# Patient Record
Sex: MALE | Race: WHITE | HISPANIC OR LATINO | Employment: OTHER | URBAN - METROPOLITAN AREA
[De-identification: names, ages, dates, MRNs, and addresses within clinical notes are randomized per-mention and may not be internally consistent; named-entity substitution may affect disease eponyms.]

---

## 2021-01-18 ENCOUNTER — HOSPITAL ENCOUNTER (EMERGENCY)
Facility: HOSPITAL | Age: 56
Discharge: HOME | End: 2021-01-18
Attending: EMERGENCY MEDICINE | Admitting: EMERGENCY MEDICINE

## 2021-01-18 VITALS
DIASTOLIC BLOOD PRESSURE: 84 MMHG | OXYGEN SATURATION: 97 % | HEART RATE: 89 BPM | HEIGHT: 64 IN | BODY MASS INDEX: 29.02 KG/M2 | SYSTOLIC BLOOD PRESSURE: 135 MMHG | WEIGHT: 170 LBS | RESPIRATION RATE: 18 BRPM | TEMPERATURE: 99.1 F

## 2021-01-18 DIAGNOSIS — U07.1 COVID-19: Primary | ICD-10-CM

## 2021-01-18 DIAGNOSIS — E11.9 NEW ONSET TYPE 2 DIABETES MELLITUS (HCC): ICD-10-CM

## 2021-01-18 LAB
ALBUMIN SERPL-MCNC: 3.3 G/DL (ref 3.5–5.2)
ALBUMIN/GLOB SERPL: 0.7 G/DL
ALP SERPL-CCNC: 91 U/L (ref 39–117)
ALT SERPL W P-5'-P-CCNC: 28 U/L (ref 1–41)
ANION GAP SERPL CALCULATED.3IONS-SCNC: 12.1 MMOL/L (ref 5–15)
AST SERPL-CCNC: 33 U/L (ref 1–40)
BACTERIA UR QL AUTO: ABNORMAL /HPF
BASOPHILS # BLD AUTO: 0.03 10*3/MM3 (ref 0–0.2)
BASOPHILS NFR BLD AUTO: 0.4 % (ref 0–1.5)
BILIRUB SERPL-MCNC: 0.5 MG/DL (ref 0–1.2)
BILIRUB UR QL STRIP: ABNORMAL
BUN SERPL-MCNC: 16 MG/DL (ref 6–20)
BUN/CREAT SERPL: 18 (ref 7–25)
CALCIUM SPEC-SCNC: 8.7 MG/DL (ref 8.6–10.5)
CHLORIDE SERPL-SCNC: 96 MMOL/L (ref 98–107)
CLARITY UR: CLEAR
CO2 SERPL-SCNC: 21.9 MMOL/L (ref 22–29)
COLOR UR: YELLOW
CREAT SERPL-MCNC: 0.89 MG/DL (ref 0.76–1.27)
DEPRECATED RDW RBC AUTO: 38.4 FL (ref 37–54)
EOSINOPHIL # BLD AUTO: 0.01 10*3/MM3 (ref 0–0.4)
EOSINOPHIL NFR BLD AUTO: 0.1 % (ref 0.3–6.2)
ERYTHROCYTE [DISTWIDTH] IN BLOOD BY AUTOMATED COUNT: 12.2 % (ref 12.3–15.4)
FLUAV RNA RESP QL NAA+PROBE: NOT DETECTED
FLUBV RNA RESP QL NAA+PROBE: NOT DETECTED
GFR SERPL CREATININE-BSD FRML MDRD: 89 ML/MIN/1.73
GLOBULIN UR ELPH-MCNC: 4.6 GM/DL
GLUCOSE BLDC GLUCOMTR-MCNC: 205 MG/DL (ref 70–130)
GLUCOSE SERPL-MCNC: 304 MG/DL (ref 65–99)
GLUCOSE UR STRIP-MCNC: ABNORMAL MG/DL
HBA1C MFR BLD: 12.1 % (ref 4.8–5.6)
HCT VFR BLD AUTO: 41.5 % (ref 37.5–51)
HGB BLD-MCNC: 14.3 G/DL (ref 13–17.7)
HGB UR QL STRIP.AUTO: ABNORMAL
HYALINE CASTS UR QL AUTO: ABNORMAL /LPF
IMM GRANULOCYTES # BLD AUTO: 0.14 10*3/MM3 (ref 0–0.05)
IMM GRANULOCYTES NFR BLD AUTO: 1.7 % (ref 0–0.5)
KETONES UR QL STRIP: ABNORMAL
LEUKOCYTE ESTERASE UR QL STRIP.AUTO: NEGATIVE
LIPASE SERPL-CCNC: 38 U/L (ref 13–60)
LYMPHOCYTES # BLD AUTO: 1.65 10*3/MM3 (ref 0.7–3.1)
LYMPHOCYTES NFR BLD AUTO: 20.3 % (ref 19.6–45.3)
MCH RBC QN AUTO: 29.5 PG (ref 26.6–33)
MCHC RBC AUTO-ENTMCNC: 34.5 G/DL (ref 31.5–35.7)
MCV RBC AUTO: 85.7 FL (ref 79–97)
MONOCYTES # BLD AUTO: 0.69 10*3/MM3 (ref 0.1–0.9)
MONOCYTES NFR BLD AUTO: 8.5 % (ref 5–12)
NEUTROPHILS NFR BLD AUTO: 5.6 10*3/MM3 (ref 1.7–7)
NEUTROPHILS NFR BLD AUTO: 69 % (ref 42.7–76)
NITRITE UR QL STRIP: NEGATIVE
NRBC BLD AUTO-RTO: 0 /100 WBC (ref 0–0.2)
PH UR STRIP.AUTO: 6 [PH] (ref 4.5–8)
PLAT MORPH BLD: NORMAL
PLATELET # BLD AUTO: 232 10*3/MM3 (ref 140–450)
PMV BLD AUTO: 10.1 FL (ref 6–12)
POTASSIUM SERPL-SCNC: 4.1 MMOL/L (ref 3.5–5.2)
PROT SERPL-MCNC: 7.9 G/DL (ref 6–8.5)
PROT UR QL STRIP: ABNORMAL
RBC # BLD AUTO: 4.84 10*6/MM3 (ref 4.14–5.8)
RBC # UR: ABNORMAL /HPF
RBC MORPH BLD: NORMAL
REF LAB TEST METHOD: ABNORMAL
SARS-COV-2 RNA RESP QL NAA+PROBE: DETECTED
SODIUM SERPL-SCNC: 130 MMOL/L (ref 136–145)
SP GR UR STRIP: 1.02 (ref 1–1.03)
SQUAMOUS #/AREA URNS HPF: ABNORMAL /HPF
UROBILINOGEN UR QL STRIP: ABNORMAL
WBC # BLD AUTO: 8.12 10*3/MM3 (ref 3.4–10.8)
WBC MORPH BLD: NORMAL
WBC UR QL AUTO: ABNORMAL /HPF

## 2021-01-18 PROCEDURE — 87636 SARSCOV2 & INF A&B AMP PRB: CPT | Performed by: PHYSICIAN ASSISTANT

## 2021-01-18 PROCEDURE — 96374 THER/PROPH/DIAG INJ IV PUSH: CPT

## 2021-01-18 PROCEDURE — 81001 URINALYSIS AUTO W/SCOPE: CPT | Performed by: PHYSICIAN ASSISTANT

## 2021-01-18 PROCEDURE — 99283 EMERGENCY DEPT VISIT LOW MDM: CPT

## 2021-01-18 PROCEDURE — 80053 COMPREHEN METABOLIC PANEL: CPT | Performed by: PHYSICIAN ASSISTANT

## 2021-01-18 PROCEDURE — 85025 COMPLETE CBC W/AUTO DIFF WBC: CPT | Performed by: PHYSICIAN ASSISTANT

## 2021-01-18 PROCEDURE — 82962 GLUCOSE BLOOD TEST: CPT

## 2021-01-18 PROCEDURE — 25010000002 ONDANSETRON PER 1 MG: Performed by: EMERGENCY MEDICINE

## 2021-01-18 PROCEDURE — 63710000001 INSULIN REGULAR HUMAN PER 5 UNITS: Performed by: EMERGENCY MEDICINE

## 2021-01-18 PROCEDURE — 85007 BL SMEAR W/DIFF WBC COUNT: CPT | Performed by: PHYSICIAN ASSISTANT

## 2021-01-18 PROCEDURE — 83036 HEMOGLOBIN GLYCOSYLATED A1C: CPT | Performed by: PHYSICIAN ASSISTANT

## 2021-01-18 PROCEDURE — 99283 EMERGENCY DEPT VISIT LOW MDM: CPT | Performed by: EMERGENCY MEDICINE

## 2021-01-18 PROCEDURE — 83690 ASSAY OF LIPASE: CPT | Performed by: PHYSICIAN ASSISTANT

## 2021-01-18 RX ORDER — ONDANSETRON 2 MG/ML
4 INJECTION INTRAMUSCULAR; INTRAVENOUS ONCE
Status: COMPLETED | OUTPATIENT
Start: 2021-01-18 | End: 2021-01-18

## 2021-01-18 RX ADMIN — ONDANSETRON 4 MG: 2 INJECTION, SOLUTION INTRAMUSCULAR; INTRAVENOUS at 21:36

## 2021-01-18 RX ADMIN — SODIUM CHLORIDE 1000 ML: 9 INJECTION, SOLUTION INTRAVENOUS at 21:58

## 2021-01-18 RX ADMIN — HUMAN INSULIN 6 UNITS: 100 INJECTION, SOLUTION SUBCUTANEOUS at 22:24

## 2021-01-19 ENCOUNTER — PATIENT OUTREACH (OUTPATIENT)
Dept: CASE MANAGEMENT | Facility: OTHER | Age: 56
End: 2021-01-19

## 2021-01-19 ENCOUNTER — EPISODE CHANGES (OUTPATIENT)
Dept: CASE MANAGEMENT | Facility: OTHER | Age: 56
End: 2021-01-19

## 2021-01-19 NOTE — OUTREACH NOTE
ED Potential Covid Discharge Follow-up  Talked with patient's employer ; reviewed RN-ACM role and fluency to speak Bengali with patient. He states he will give message to patient with RN-ACM contact information to return phone call. Will continue patient outreach.     Kimberli Graves RN  Ambulatory     1/19/2021, 13:22 EST

## 2021-01-19 NOTE — ED PROVIDER NOTES
EMERGENCY DEPARTMENT ENCOUNTER      Room Number: 08/08    History is provided by the patient, Georgian translation utilized with [employer] interpreters.    HPI:    Chief complaint: Fever, nausea    Location: Denies any pain at this time    Quality/Severity: 0/10    Timing/Duration: 4 days    Modifying Factors: Took some unknown medicine and feels better.    Associated Symptoms: Positive for nausea.  Patient states he was also having a fever, chills, and headaches but those have resolved.  Denies any vomiting, diarrhea, abdominal pain, chest pain, shortness of breath, sore throat, runny nose.    Narrative: Pt is a 55 y.o. male who presents complaining of fever, chills, nausea, and headaches for the past 4 days.  Patient states fevers have subsided today, however he is still nauseated.  He states he has been drinking liquids without any difficulty, but is unable to eat food due to a his nausea.  He is urinating normally and denies any decreased frequency of urination. Patient states he feels fine otherwise, has no other complaints today.  Denies any past medical history and does not take any medications.  Not a drinker or a smoker.  Denies any known Covid exposures.      PMD: Provider, No Known    REVIEW OF SYSTEMS  Review of Systems   Constitutional: Positive for chills, fatigue and fever.   Respiratory: Negative for cough and shortness of breath.    Cardiovascular: Negative for chest pain and palpitations.   Gastrointestinal: Positive for nausea. Negative for abdominal distention, abdominal pain and vomiting.   Genitourinary: Negative for difficulty urinating, dysuria and urgency.   Musculoskeletal: Negative for arthralgias and myalgias.   Skin: Negative for pallor and rash.   Neurological: Positive for headaches. Negative for dizziness and syncope.   Psychiatric/Behavioral: Negative for confusion. The patient is not nervous/anxious.          PAST MEDICAL HISTORY  Active Ambulatory Problems     Diagnosis Date Noted    • No Active Ambulatory Problems     Resolved Ambulatory Problems     Diagnosis Date Noted   • No Resolved Ambulatory Problems     No Additional Past Medical History       PAST SURGICAL HISTORY  History reviewed. No pertinent surgical history.    FAMILY HISTORY  History reviewed. No pertinent family history.    SOCIAL HISTORY  Social History     Socioeconomic History   • Marital status:      Spouse name: Not on file   • Number of children: Not on file   • Years of education: Not on file   • Highest education level: Not on file   Tobacco Use   • Smoking status: Never Smoker   Substance and Sexual Activity   • Alcohol use: Never     Frequency: Never   • Drug use: Never   • Sexual activity: Defer       ALLERGIES  Patient has no known allergies.    No current facility-administered medications for this encounter.     Current Outpatient Medications:   •  metFORMIN (GLUCOPHAGE) 500 MG tablet, Take 1 tablet by mouth 2 (Two) Times a Day With Meals., Disp: 60 tablet, Rfl: 0    PHYSICAL EXAM  ED Triage Vitals [01/18/21 2048]   Temp Heart Rate Resp BP SpO2   99.1 °F (37.3 °C) 89 18 135/84 97 %      Temp src Heart Rate Source Patient Position BP Location FiO2 (%)   Oral -- Sitting Left arm --       Physical Exam   Constitutional: He is oriented to person, place, and time and well-developed, well-nourished, and in no distress.   Patient is nontoxic in appearance.   HENT:   Head: Normocephalic and atraumatic.   Eyes: Pupils are equal, round, and reactive to light. Conjunctivae are normal.   Cardiovascular: Normal rate, regular rhythm and intact distal pulses.   Pulmonary/Chest: Effort normal and breath sounds normal.   Abdominal: Soft. Bowel sounds are normal. There is no abdominal tenderness. There is no guarding.   Musculoskeletal: Normal range of motion.         General: No edema.   Neurological: He is alert and oriented to person, place, and time. GCS score is 15.   Skin: Skin is warm and dry.   Psychiatric: Mood,  memory, affect and judgment normal.   Nursing note and vitals reviewed.        LAB RESULTS  Lab Results (last 24 hours)     Procedure Component Value Units Date/Time    CBC & Differential [[id number]]  (Abnormal) Collected: 01/18/21 2122    Specimen: Blood Updated: 01/18/21 2214    Narrative:      The following orders were created for panel order CBC & Differential.  Procedure                               Abnormality         Status                     ---------                               -----------         ------                     Scan Slide[[id number]]                   Normal              Final result               CBC Auto Differential[[id number]]        Abnormal            Final result                 Please view results for these tests on the individual orders.    Comprehensive Metabolic Panel [[id number]]  (Abnormal) Collected: 01/18/21 2122    Specimen: Blood Updated: 01/18/21 2153     Glucose 304 mg/dL      BUN 16 mg/dL      Creatinine 0.89 mg/dL      Sodium 130 mmol/L      Potassium 4.1 mmol/L      Chloride 96 mmol/L      CO2 21.9 mmol/L      Calcium 8.7 mg/dL      Total Protein 7.9 g/dL      Albumin 3.30 g/dL      ALT (SGPT) 28 U/L      AST (SGOT) 33 U/L      Alkaline Phosphatase 91 U/L      Total Bilirubin 0.5 mg/dL      eGFR Non African Amer 89 mL/min/1.73      Globulin 4.6 gm/dL      A/G Ratio 0.7 g/dL      BUN/Creatinine Ratio 18.0     Anion Gap 12.1 mmol/L     Narrative:      GFR Normal >60  Chronic Kidney Disease <60  Kidney Failure <15      Lipase [[id number]]  (Normal) Collected: 01/18/21 2122    Specimen: Blood Updated: 01/18/21 2153     Lipase 38 U/L     Urinalysis With Microscopic If Indicated (No Culture) - Urine, Clean Catch [[id number]]  (Abnormal) Collected: 01/18/21 2122    Specimen: Urine, Clean Catch Updated: 01/18/21 2132     Color, UA Yellow     Appearance, UA Clear     pH, UA 6.0     Specific Gravity, UA 1.025     Glucose,  mg/dL (2+)     Ketones, UA 40 mg/dL (2+)     Bilirubin,  UA Small (1+)     Blood, UA Trace     Protein, UA >=300 mg/dL (3+)     Leuk Esterase, UA Negative     Nitrite, UA Negative     Urobilinogen, UA 2.0 E.U./dL    CBC Auto Differential [[id number]]  (Abnormal) Collected: 01/18/21 2122    Specimen: Blood Updated: 01/18/21 2132     WBC 8.12 10*3/mm3      RBC 4.84 10*6/mm3      Hemoglobin 14.3 g/dL      Hematocrit 41.5 %      MCV 85.7 fL      MCH 29.5 pg      MCHC 34.5 g/dL      RDW 12.2 %      RDW-SD 38.4 fl      MPV 10.1 fL      Platelets 232 10*3/mm3      Neutrophil % 69.0 %      Lymphocyte % 20.3 %      Monocyte % 8.5 %      Eosinophil % 0.1 %      Basophil % 0.4 %      Immature Grans % 1.7 %      Neutrophils, Absolute 5.60 10*3/mm3      Lymphocytes, Absolute 1.65 10*3/mm3      Monocytes, Absolute 0.69 10*3/mm3      Eosinophils, Absolute 0.01 10*3/mm3      Basophils, Absolute 0.03 10*3/mm3      Immature Grans, Absolute 0.14 10*3/mm3      nRBC 0.0 /100 WBC     Scan Slide [[id number]]  (Normal) Collected: 01/18/21 2122    Specimen: Blood Updated: 01/18/21 2214     RBC Morphology Normal     WBC Morphology Normal     Platelet Morphology Normal    Urinalysis, Microscopic Only - Urine, Clean Catch [[id number]]  (Abnormal) Collected: 01/18/21 2122    Specimen: Urine, Clean Catch Updated: 01/18/21 2144     RBC, UA 0-2 /HPF      WBC, UA 3-5 /HPF      Bacteria, UA 1+ /HPF      Squamous Epithelial Cells, UA 3-6 /HPF      Hyaline Casts, UA None Seen /LPF      Methodology Manual Light Microscopy    Hemoglobin A1c [[id number]]  (Abnormal) Collected: 01/18/21 2122    Specimen: Blood Updated: 01/18/21 2219     Hemoglobin A1C 12.10 %     Narrative:      Hemoglobin A1C Ranges:    Increased Risk for Diabetes  5.7% to 6.4%  Diabetes                     >= 6.5%  Diabetic Goal                < 7.0%    COVID-19 and FLU A/B PCR - Swab, Nasopharynx [[id number]]  (Abnormal) Collected: 01/18/21 2123    Specimen: Swab from Nasopharynx Updated: 01/18/21 2201     COVID19 Detected     Influenza A PCR Not  Detected     Influenza B PCR Not Detected    Narrative:      Fact sheet for providers: https://www.fda.gov/media/122640/download    Fact sheet for patients: https://www.fda.gov/media/543142/download    Test performed by PCR.  Influenza A and Influenza B negative results should be considered presumptive in samples that have a positive SARS-CoV-2 result.    Competitive inhibition studies showed that SARS-CoV-2 virus, when present at concentrations above 3.6E+04 copies/mL, can inhibit the detection and amplification of influenza A and influenza B virus RNA if present at or below 1.8E+02 copies/mL or 4.9E+02 copies/mL, respectively, and may lead to false negative influenza virus results. If co-infection with influenza A or influenza B virus is suspected in samples with a positive SARS-CoV-2 result, the sample should be re-tested with another FDA cleared, approved, or authorized influenza test, if influenza virus detection would change clinical management.            I ordered the above labs and reviewed the results    RADIOLOGY  No Radiology Exams Resulted Within Past 24 Hours    I ordered the above radiologic testing and reviewed the results    PROCEDURES  Procedures      PROGRESS AND CONSULTS  ED Course as of Jan 18 2248   Mon Jan 18, 2021 2158 Patient presented with 3-day history of fever, and persistent nausea.  Placed in isolation for Covid rule out, and given a dose of Zofran.  Patient's glucose is 304, without known history of diabetes.  Corrected sodium is 133, bicarb 21.9, anion gap is normal.  We will go ahead and hydrate patient with 1 L of NS, and reevaluate glucose after fluids.  I have added on an A1c to his labs.    [KS]   2217 Care handed over to Dr. Clark.    [KS]   2229 Hemoglobin A1c is 12.1 indicating that the patient blood sugars been elevated for a while.  He is a new type II diabetic.  He does not have a PCP.  He was administered 6 units of regular insulin IV in addition IV fluids administered.   He will be discharged on Metformin 500 mg twice daily.  He will be instructed to follow-up with the [place] [facility].  He is Covid positive, but has normal oxygen saturations and is not sick enough to require hospitalization.    [TP]      ED Course User Index  [KS] Kristie Tariq PA-C  [TP] Archie Clark MD           MEDICAL DECISION MAKING  Results were reviewed/discussed with the patient and they were also made aware of online access. Pt also made aware that some labs, such as cultures, will not be resulted during ER visit and follow up with PMD is necessary.     MDM  Number of Diagnoses or Management Options  COVID-19: new and requires workup  New onset type 2 diabetes mellitus (CMS/HCC): new and requires workup     Amount and/or Complexity of Data Reviewed  Clinical lab tests: reviewed and ordered    Risk of Complications, Morbidity, and/or Mortality  Presenting problems: high  Diagnostic procedures: high  Management options: high    Patient Progress  Patient progress: stable         By differential diagnosis for fever includes but is not limited:  To viral infections, bacterial infections, fungal infections, fever of unknown origin, auto regulatory dysfunction, hyperthermia, heat exhaustion, heat stroke      DIAGNOSIS  Final diagnoses:   COVID-19   New onset type 2 diabetes mellitus (CMS/HCC)       Latest Documented Vital Signs:  As of 22:48 EST  BP- 135/84 HR- 89 Temp- 99.1 °F (37.3 °C) (Oral) O2 sat- 97%    DISPOSITION      Discussed pertinent labs with the patient/family.  Patient/Family voiced understanding of need to follow-up for recheck, further testing as needed.  Return to the emergency Department warnings were given.         Medication List      New Prescriptions    metFORMIN 500 MG tablet  Commonly known as: GLUCOPHAGE  Take 1 tablet by mouth 2 (Two) Times a Day With Meals.           Where to Get Your Medications      You can get these medications from any pharmacy    Bring a paper prescription  for each of these medications  · metFORMIN 500 MG tablet             Follow-up Information     [facility]. Schedule an appointment as soon as possible for a visit in 10 days.    Contact information:  [address] [place] [address] [phone]                   Dictated utilizing [place]te dictation    Labs Reviewed   COVID-19 AND FLU A/B, NP SWAB IN TRANSPORT MEDIA 8-12 HR TAT - Abnormal; Notable for the following components:       Result Value    COVID19 Detected (*)     All other components within normal limits    Narrative:     Fact sheet for providers: https://www.fda.gov/media/325207/download    Fact sheet for patients: https://www.fda.gov/media/095405/download    Test performed by PCR.  Influenza A and Influenza B negative results should be considered presumptive in samples that have a positive SARS-CoV-2 result.    Competitive inhibition studies showed that SARS-CoV-2 virus, when present at concentrations above 3.6E+04 copies/mL, can inhibit the detection and amplification of influenza A and influenza B virus RNA if present at or below 1.8E+02 copies/mL or 4.9E+02 copies/mL, respectively, and may lead to false negative influenza virus results. If co-infection with influenza A or influenza B virus is suspected in samples with a positive SARS-CoV-2 result, the sample should be re-tested with another FDA cleared, approved, or authorized influenza test, if influenza virus detection would change clinical management.   COMPREHENSIVE METABOLIC PANEL - Abnormal; Notable for the following components:    Glucose 304 (*)     Sodium 130 (*)     Chloride 96 (*)     CO2 21.9 (*)     Albumin 3.30 (*)     All other components within normal limits    Narrative:     GFR Normal >60  Chronic Kidney Disease <60  Kidney Failure <15     URINALYSIS W/ MICROSCOPIC IF INDICATED (NO CULTURE) - Abnormal; Notable for the following components:    Glucose,  mg/dL (2+) (*)     Ketones, UA 40 mg/dL (2+) (*)     Bilirubin,  UA Small (1+) (*)     Blood, UA Trace (*)     Protein, UA >=300 mg/dL (3+) (*)     Urobilinogen, UA 2.0 E.U./dL (*)     All other components within normal limits   CBC WITH AUTO DIFFERENTIAL - Abnormal; Notable for the following components:    RDW 12.2 (*)     Eosinophil % 0.1 (*)     Immature Grans % 1.7 (*)     Immature Grans, Absolute 0.14 (*)     All other components within normal limits   URINALYSIS, MICROSCOPIC ONLY - Abnormal; Notable for the following components:    RBC, UA 0-2 (*)     WBC, UA 3-5 (*)     Bacteria, UA 1+ (*)     Squamous Epithelial Cells, UA 3-6 (*)     All other components within normal limits   HEMOGLOBIN A1C - Abnormal; Notable for the following components:    Hemoglobin A1C 12.10 (*)     All other components within normal limits    Narrative:     Hemoglobin A1C Ranges:    Increased Risk for Diabetes  5.7% to 6.4%  Diabetes                     >= 6.5%  Diabetic Goal                < 7.0%   LIPASE - Normal   SCAN SLIDE - Normal   CBC AND DIFFERENTIAL    Narrative:     The following orders were created for panel order CBC & Differential.  Procedure                               Abnormality         Status                     ---------                               -----------         ------                     Scan Slide[[id number]]                   Normal              Final result               CBC Auto Differential[[id number]]        Abnormal            Final result                 Please view results for these tests on the individual orders.     No orders to display          Medication List      New Prescriptions    metFORMIN 500 MG tablet  Commonly known as: GLUCOPHAGE  Take 1 tablet by mouth 2 (Two) Times a Day With Meals.           Where to Get Your Medications      You can get these medications from any pharmacy    Bring a paper prescription for each of these medications  · metFORMIN 500 MG tablet              Archie Clark MD  01/18/21 9080

## 2021-01-19 NOTE — OUTREACH NOTE
ED Potential Covid Discharge Follow-up  Incoming call from patient. Patient speaks in Sri Lankan and RN-ACM spoke in [place]ankan with patient. Discussed 1/18/21 ED visit regarding COVID 19 test and new onset of diabetes. Patient received COVID 19 test results as positive. Patient compliant with ED recommendations; will be obtaining Metformin today and under quarantine.Metformin  Patient lives in [place] and will be returning after quarantine.Metoformin will be delivered to patient.   Patient reports symptoms of: Improvement regarding appetite and nausea. He reports no difficulty with fever; chest pain; SOB  or sleeping. Patient states to be tolerating food better and tolerating fluids without difficulty.   Patient reports no barriers in obtaining : food; medication and has transportation.    Patient lives alone; independent with ADL's; receiving assistance from employer as needed. Reviewed with patient education of DM.    Reviewed with patient: ED AVS recommendations; quarantine education; education provided regarding DM; Metformin; education regarding being free of fever for 24 hours without use of Tylenol; hydration;  24/7 Nurse Triage Line; COVID 19 information telephone line; ACM contact information;  affordability of food; medications; transportation; continued monitoring of symptoms; evaluation of worsening symptoms and establishing contact with PCP for follow up recommendations.Advised patient to obtain  PCP and glucometer to monitor blood sugars when he returns home for follow up.  Patient verbalized understanding and states to appreciate phone call. No further questions voiced at this time.       Kimberli Graves RN  Ambulatory     1/19/2021, 13:49 EST